# Patient Record
Sex: FEMALE | Race: WHITE | Employment: UNEMPLOYED | URBAN - METROPOLITAN AREA
[De-identification: names, ages, dates, MRNs, and addresses within clinical notes are randomized per-mention and may not be internally consistent; named-entity substitution may affect disease eponyms.]

---

## 2019-09-25 ENCOUNTER — OFFICE VISIT (OUTPATIENT)
Dept: ENDOCRINOLOGY | Age: 59
End: 2019-09-25
Payer: COMMERCIAL

## 2019-09-25 VITALS
WEIGHT: 121.2 LBS | BODY MASS INDEX: 23.8 KG/M2 | DIASTOLIC BLOOD PRESSURE: 74 MMHG | SYSTOLIC BLOOD PRESSURE: 116 MMHG | HEIGHT: 60 IN | HEART RATE: 76 BPM | RESPIRATION RATE: 16 BRPM

## 2019-09-25 DIAGNOSIS — Z86.39 H/O HYPERTHYROIDISM: ICD-10-CM

## 2019-09-25 DIAGNOSIS — E04.2 MULTINODULAR GOITER: Primary | ICD-10-CM

## 2019-09-25 DIAGNOSIS — E03.9 HYPOTHYROIDISM, UNSPECIFIED TYPE: ICD-10-CM

## 2019-09-25 PROCEDURE — 99214 OFFICE O/P EST MOD 30 MIN: CPT | Performed by: INTERNAL MEDICINE

## 2019-09-25 PROCEDURE — 1036F TOBACCO NON-USER: CPT | Performed by: INTERNAL MEDICINE

## 2019-09-25 PROCEDURE — 3017F COLORECTAL CA SCREEN DOC REV: CPT | Performed by: INTERNAL MEDICINE

## 2019-09-25 PROCEDURE — G8420 CALC BMI NORM PARAMETERS: HCPCS | Performed by: INTERNAL MEDICINE

## 2019-09-25 PROCEDURE — G8427 DOCREV CUR MEDS BY ELIG CLIN: HCPCS | Performed by: INTERNAL MEDICINE

## 2019-09-25 RX ORDER — DOXYCYCLINE 40 MG/1
40 CAPSULE ORAL DAILY
COMMUNITY

## 2019-09-25 RX ORDER — ELECTROLYTES/DEXTROSE
SOLUTION, ORAL ORAL DAILY
COMMUNITY

## 2019-09-25 RX ORDER — OSPEMIFENE 60 MG/1
TABLET, FILM COATED ORAL DAILY
Refills: 2 | COMMUNITY
Start: 2019-08-17

## 2019-09-25 SDOH — HEALTH STABILITY: MENTAL HEALTH: HOW OFTEN DO YOU HAVE A DRINK CONTAINING ALCOHOL?: 2-4 TIMES A MONTH

## 2019-09-25 NOTE — PROGRESS NOTES
[address] [place] [address] Department of Endocrinology Diabetes and Metabolism   [address] [facility] [address]   Phone: [phone]  Fax: [phone]    Date of Service: 9/25/2019    Primary Care Physician: Yessenia Askew MD  Provider: Duane Owen MD    Reason for the visit:  Hyperthyroidism, MNG, VitD deficiency     History of Present Illness: The history is provided by the patient. No  was used. Accuracy of the patient data is excellent. Yg Rivas is a very pleasant 66 y.o. female seen in the clinic today for follow up on MNG and hyperthyroidism     She had a routine lab work done by her PCP in mid 2017. Att that time a TSH and free T4 were obtained and revealed hyperthyroidism. Pt was sent for FNA of the nodule but was unable to get biopsy secondary to a small 8mm nodule, likely previous nodule was a pseudo nodule    Thyroid US 8/7/2017 0.7cm X 0.4cm X 0.5cm complex, solid nodule on right lobe and 0.8cm cyst on right lobe. 1.3cm X 0.7cm X 0.8cm poorly marginated nodule left lobe. Thyroid uptake and scan study 9/8/2017 6 and 24 hour uptake calculated at 4.82% and 6.18%. Impression: Question hypothyroidism given the low activity both at 6 and 24 hours. FNA unable to perform on 10/4/17 secondary to only a small 8mm less nodular areas in a diffusely inhomogeneous thyroid gland. Most recent thyroid US 9/12/2019  The right lobe measures 5.0 x 1.6 x 1.7 cm . The left lobe measures 4.6 x 1.7 x 1.6 cm. The isthmus measures 1 mm in thickness. There is a 10 x 9 x 9 mm nodule is isoechoic as well as heterogeneous to hypoechoic 10 x 7 x 8 and 7 x 4 x 6 mm nodule in the right lobe. There is Left 10 x 6 x 9 mm hypoechoic, 5 x 3 x 44 millimeter, as well as x 3 x 3 mm Hypoechoic nodules that are heterogeneous in echotexture in Lt lobe   IMPRESSION  Impression: Multinodular goiter. Mo numerous nodules are now seen in the left lobe compared to previous. from Last 4 Encounters:   19 116/74     Wt Readings from Last 6 Encounters:   19 121 lb 3.2 oz (55 kg)       Physical examination:  General: awake alert, oriented x3, no abnormal position or movements. HEENT: normocephalic non traumatic, no exophthalmos, no lid lag, no lid retraction   Neck: supple, no LN enlargement, no thyromegaly, no thyroid tenderness, no thyroid bruit, no JVD. Pulm: Clear equal air entry no added sounds, no wheezing or rhonchi    CVS: S1 + S2, no murmur, no heave. Dorsalis pedis pulse palpable   Abd: soft lax, no tenderness, no organomegaly, audible bowel sounds. Skin: warm, no lesions, no rash. No palmar erythema, no onycholysis, no pretibial Myxoedema, no acropachy   Musculoskeletal: No back tenderness, no kyphosis/scoliosis    Neuro: CN intact, sensation notmal , muscle power normal. No tremors   Psych: normal mood, and affect    Review of Laboratory Data:  I have reviewed the followin2019  Wbc 4.4, Hb 14, Plt 261, glucose 88, Cr 0.86, GFR >60, Na 142, K 4.6, Ca 8.8, AST 28, ALT 15, TG 81, , TSH 1.97, TPO-Ab negative, Tg-Ab negative   No results found for: WBC, RBC, HGB, HCT, MCV, MCH, MCHC, RDW, PLT, MPV, GRANULOCYTES, BANDS   No results found for: NA, K, CO2, BUN, CREATININE, CALCIUM, LABGLOM, GFRAA   No results found for: TSH, T4FREE, Q3QUCZF, FT3, A7QVZYQ, TSI, TPOABS, THGAB  No results found for: LABA1C, GLUCOSE, MALBCR, LABMICR, LABCREA  No results found for: CHOL, TRIG, HDL  No results found for: VITD25    Medical Records/Labs/Images Review:   I personally reviewed and summarized previous records   All labs and imaging were reviewed independently    [address], a 66 y.o.-old female seen in for management of following issues      Multinodular goiter   · Prevalence of thyroid nodule on thyroid ultrasound is 50% and 95 % of these nodules are benign.   · TFT before next visit   · Given nodule size and lack of ultrasound

## 2019-09-25 NOTE — LETTER
nodule left lobe. Thyroid uptake and scan study 9/8/2017 6 and 24 hour uptake calculated at 4.82% and 6.18%. Impression: Question hypothyroidism given the low activity both at 6 and 24 hours. FNA unable to perform on 10/4/17 secondary to only a small 8mm less nodular areas in a diffusely inhomogeneous thyroid gland. Most recent thyroid US 9/12/2019  The right lobe measures 5.0 x 1.6 x 1.7 cm . The left lobe measures 4.6 x 1.7 x 1.6 cm. The isthmus measures 1 mm in thickness. There is a 10 x 9 x 9 mm nodule is isoechoic as well as heterogeneous to hypoechoic 10 x 7 x 8 and 7 x 4 x 6 mm nodule in the right lobe. There is Left 10 x 6 x 9 mm hypoechoic, 5 x 3 x 44 millimeter, as well as x 3 x 3 mm Hypoechoic nodules that are heterogeneous in echotexture in Lt lobe   IMPRESSION  Impression: Multinodular goiter. Mo numerous nodules are now seen in the left lobe compared to previous. Overall, the nodules that were previously seen in the right lobe are slightly larger in volume    Sara Abreu denies any new lumps, bumps in her neck, change in her voice, or shortness of breath. No family history of thyroid cancer. No prior history of radiation to head or neck region. Patient denied any history of swelling in the area of the thyroid gland, weight loss, change in appetite, nervousness, anxiety, irritability, tremor, sweating, heat intolerance, changes in bowel habits, muscle weakness or difficulty sleeping. Patient also denied any h/o unexplained weight gain, new fatigue, increased sensitivity to cold, dry skin, brittle fingernails, brittle hair, facial swelling/puffiness, or depressed mood.      PAST MEDICAL HISTORY   Past Medical History:   Diagnosis Date    Multinodular goiter     Vitamin D deficiency      PAST SURGICAL HISTORY   Past Surgical History:   Procedure Laterality Date    BREAST BIOPSY      WISDOM TOOTH EXTRACTION       SOCIAL HISTORY   Social History     Socioeconomic History

## 2020-06-11 ENCOUNTER — VIRTUAL VISIT (OUTPATIENT)
Dept: ENDOCRINOLOGY | Age: 60
End: 2020-06-11
Payer: COMMERCIAL

## 2020-06-11 PROBLEM — E55.9 VITAMIN D DEFICIENCY: Status: ACTIVE | Noted: 2020-06-11

## 2020-06-11 PROBLEM — E04.2 MULTINODULAR GOITER: Status: ACTIVE | Noted: 2020-06-11

## 2020-06-11 PROBLEM — Z86.39 H/O HYPERTHYROIDISM: Status: ACTIVE | Noted: 2020-06-11

## 2020-06-11 PROCEDURE — 3017F COLORECTAL CA SCREEN DOC REV: CPT | Performed by: INTERNAL MEDICINE

## 2020-06-11 PROCEDURE — G8427 DOCREV CUR MEDS BY ELIG CLIN: HCPCS | Performed by: INTERNAL MEDICINE

## 2020-06-11 PROCEDURE — 99214 OFFICE O/P EST MOD 30 MIN: CPT | Performed by: INTERNAL MEDICINE

## 2020-06-11 PROCEDURE — G8420 CALC BMI NORM PARAMETERS: HCPCS | Performed by: INTERNAL MEDICINE

## 2020-06-11 PROCEDURE — 1036F TOBACCO NON-USER: CPT | Performed by: INTERNAL MEDICINE

## 2020-06-11 NOTE — LETTER
 Thyroid Disease Maternal Uncle     Thyroid Cancer Neg Hx      ALLERGIES AND DRUG REACTIONS   Allergies   Allergen Reactions    Diphenhydramine     Terfenadine        CURRENT MEDICATIONS   Current Outpatient Medications   Medication Sig Dispense Refill    Multiple Vitamins-Minerals (MULTIVITAMIN ADULT) TABS Take by mouth daily      doxycycline (ORACEA) 40 MG delayed release capsule Take 40 mg by mouth daily      OSPHENA 60 MG TABS daily  2     No current facility-administered medications for this visit. Review of Systems  Constitutional: No fever, no chills, no diaphoresis, no generalized weakness. HEENT: No blurred vision, No sore throat, no ear pain, no hair loss  Neck: denied any neck swelling, difficulty swallowing,   Cadrdiopulomary: No CP, SOB or palpitation, No orthopnea or PND. No cough or wheezing. GI: No N/V/D, no constipation, No abdominal pain, no melena or hematochezia   : Denied any dysuria, hematuria, flank pain, discharge, or incontinence. Skin: denied any rash, ulcer, Hirsute, or hyperpigmentation. MSK: denied any joint deformity, joint pain/swelling, muscle pain, or back pain. Neuro: no numbess, no tingling, no weakness,     OBJECTIVE    There were no vitals taken for this visit. BP Readings from Last 4 Encounters:   09/25/19 116/74     Wt Readings from Last 6 Encounters:   09/25/19 121 lb 3.2 oz (55 kg)     Physical examination:  Due to this being a TeleHealth encounter, evaluation of the following organ systems is limited: Vitals/Constitutional/EENT/Resp/CV/GI//MS/Neuro/Skin/Heme-Lymph-Imm. Modified physical exam through Telemedicine camera    General: Communicating well via camera   Neck: no obvious neck mass. No obvious neck deformity     CVS: no distress   Chest: no distress.  Chest is moving with respiration    Extremities:  no visible tremor  Skin: No visible rashes as seen from camera   Musculoskeletal: no visible deformity

## 2020-06-15 LAB
T4 FREE: 1.19
TSH SERPL DL<=0.05 MIU/L-ACNC: 1.23 UIU/ML
VITAMIN D 25-HYDROXY: 48.2
VITAMIN D2, 25 HYDROXY: NORMAL
VITAMIN D3,25 HYDROXY: NORMAL

## 2020-06-17 ENCOUNTER — TELEPHONE (OUTPATIENT)
Dept: ENDOCRINOLOGY | Age: 60
End: 2020-06-17

## 2020-07-06 ENCOUNTER — TELEPHONE (OUTPATIENT)
Dept: ENDOCRINOLOGY | Age: 60
End: 2020-07-06

## 2020-07-06 DIAGNOSIS — E03.9 HYPOTHYROIDISM, UNSPECIFIED TYPE: ICD-10-CM

## 2020-07-06 DIAGNOSIS — Z86.39 H/O HYPERTHYROIDISM: ICD-10-CM

## 2020-07-06 DIAGNOSIS — E04.2 MULTINODULAR GOITER: ICD-10-CM

## 2020-12-14 ENCOUNTER — TELEPHONE (OUTPATIENT)
Dept: ENDOCRINOLOGY | Age: 60
End: 2020-12-14

## 2020-12-15 NOTE — TELEPHONE ENCOUNTER
Notify pt,  I have reviewed your recent results    Thyroid nodules remained stable on this US.  Will discuss this result in details at your next OV

## 2021-06-14 ENCOUNTER — OFFICE VISIT (OUTPATIENT)
Dept: ENDOCRINOLOGY | Age: 61
End: 2021-06-14
Payer: COMMERCIAL

## 2021-06-14 VITALS
WEIGHT: 115 LBS | DIASTOLIC BLOOD PRESSURE: 72 MMHG | HEART RATE: 68 BPM | RESPIRATION RATE: 18 BRPM | BODY MASS INDEX: 22.58 KG/M2 | OXYGEN SATURATION: 99 % | SYSTOLIC BLOOD PRESSURE: 118 MMHG | HEIGHT: 60 IN

## 2021-06-14 DIAGNOSIS — E55.9 VITAMIN D DEFICIENCY: ICD-10-CM

## 2021-06-14 DIAGNOSIS — E04.2 MULTINODULAR GOITER: Primary | ICD-10-CM

## 2021-06-14 DIAGNOSIS — Z86.39 H/O HYPERTHYROIDISM: ICD-10-CM

## 2021-06-14 LAB
BUN BLDV-MCNC: NORMAL MG/DL
CALCIUM SERPL-MCNC: 9 MG/DL
CHLORIDE BLD-SCNC: NORMAL MMOL/L
CO2: NORMAL
CREAT SERPL-MCNC: 0.82 MG/DL
GFR CALCULATED: NORMAL
GLUCOSE BLD-MCNC: NORMAL MG/DL
POTASSIUM SERPL-SCNC: 4.4 MMOL/L
SODIUM BLD-SCNC: 141 MMOL/L
T4 FREE: 1.18
TSH SERPL DL<=0.05 MIU/L-ACNC: 1.13 UIU/ML
VITAMIN D 25-HYDROXY: 38.7
VITAMIN D2, 25 HYDROXY: NORMAL
VITAMIN D3,25 HYDROXY: NORMAL

## 2021-06-14 PROCEDURE — 1036F TOBACCO NON-USER: CPT | Performed by: INTERNAL MEDICINE

## 2021-06-14 PROCEDURE — G8427 DOCREV CUR MEDS BY ELIG CLIN: HCPCS | Performed by: INTERNAL MEDICINE

## 2021-06-14 PROCEDURE — 3017F COLORECTAL CA SCREEN DOC REV: CPT | Performed by: INTERNAL MEDICINE

## 2021-06-14 PROCEDURE — G8420 CALC BMI NORM PARAMETERS: HCPCS | Performed by: INTERNAL MEDICINE

## 2021-06-14 PROCEDURE — 99214 OFFICE O/P EST MOD 30 MIN: CPT | Performed by: INTERNAL MEDICINE

## 2021-06-14 NOTE — PROGRESS NOTES
[address] [place] [address] Department of Endocrinology Diabetes and Metabolism   [address] [facility] [address]   Phone: [phone]  Fax: [phone]    Date of Service: 6/14/2021  Primary Care Physician: Nneka Levy MD  Provider: Terra Dobbs MD    Reason for the visit:  Hyperthyroidism, MNG, VitD deficiency     History of Present Illness: The history is provided by the patient. No  was used. Accuracy of the patient data is excellent. Naveen Morales is a very pleasant 64 y.o. female seen today for follow up visit     She had a routine lab work done by her PCP in mid 2017. Att that time a TSH and free T4 were obtained and revealed hyperthyroidism. Pt was sent for FNA of the nodule but was unable to get biopsy secondary to a small 8mm nodule, likely previous nodule was a pseudo nodule    Thyroid US 8/7/2017 0.7cm X 0.4cm X 0.5cm complex, solid nodule on right lobe and 0.8cm cyst on right lobe. 1.3cm X 0.7cm X 0.8cm poorly marginated nodule left lobe. Thyroid uptake and scan study 9/8/2017 6 and 24 hour uptake calculated at 4.82% and 6.18%. Impression: Question hypothyroidism given the low activity both at 6 and 24 hours. FNA unable to perform on 10/4/17 secondary to only a small 8mm less nodular areas in a diffusely inhomogeneous thyroid gland. Thyroid US 9/12/2019  The right lobe measures 5.0 x 1.6 x 1.7 cm --> multiple nodules largest measuring 10, 10 and 7 mm   The left lobe measures 4.6 x 1.7 x 1.6 cm --> multiple nodules largest measuring 10, 5 and 3 mm   The isthmus measures 1 mm --> no nodules     Thyroid US 5/29/2020  Stable Rt and Lt lobe thyroid nodules   New 1.2 cm mixed thyroid nodule in the Rt thyroid isthmus     US 12/2020 --> stable MNG, isthmus nodule measuring 1.1 cm     Naveen Morales denies any new lumps, bumps in her neck, change in her voice, or shortness of breath. No family history of thyroid cancer.  No prior history of radiation to head or neck region. Patient denied any history of swelling in the area of the thyroid gland, weight loss, change in appetite, nervousness, anxiety, irritability, tremor, sweating, heat intolerance, changes in bowel habits, muscle weakness or difficulty sleeping    Patient also denied any h/o unexplained weight gain, new fatigue, increased sensitivity to cold, dry skin, brittle fingernails, brittle hair, facial swelling/puffiness, or depressed mood. PAST MEDICAL HISTORY   Past Medical History:   Diagnosis Date    Multinodular goiter     Vitamin D deficiency      PAST SURGICAL HISTORY   Past Surgical History:   Procedure Laterality Date    BREAST BIOPSY      WISDOM TOOTH EXTRACTION       SOCIAL HISTORY   Tobacco:   reports that she has never smoked. She has never used smokeless tobacco.  Alcol:   reports current alcohol use. Illicit Drugs:   has no history on file for drug use. FAMILY HISTORY   Family History   Problem Relation Age of Onset    Thyroid Disease Mother     Thyroid Disease Maternal Aunt         s/p thyroid surgery (benign)     Thyroid Disease Maternal Uncle     Thyroid Cancer Neg Hx      ALLERGIES AND DRUG REACTIONS   Allergies   Allergen Reactions    Diphenhydramine     Terfenadine        CURRENT MEDICATIONS   Current Outpatient Medications   Medication Sig Dispense Refill    Multiple Vitamins-Minerals (MULTIVITAMIN ADULT) TABS Take by mouth daily      doxycycline (ORACEA) 40 MG delayed release capsule Take 40 mg by mouth daily      OSPHENA 60 MG TABS daily  2     No current facility-administered medications for this visit. Review of Systems  Constitutional: No fever, no chills, no diaphoresis, no generalized weakness. HEENT: No blurred vision, No sore throat, no ear pain, no hair loss  Neck: denied any neck swelling, difficulty swallowing,   Cadrdiopulomary: No CP, SOB or palpitation, No orthopnea or PND. No cough or wheezing.   GI: No N/V/D, no constipation, No abdominal pain, no melena or hematochezia   : Denied any dysuria, hematuria, flank pain, discharge, or incontinence. Skin: denied any rash, ulcer, Hirsute, or hyperpigmentation. MSK: denied any joint deformity, joint pain/swelling, muscle pain, or back pain. Neuro: no numbess, no tingling, no weakness,     OBJECTIVE    /72   Pulse 68   Resp 18   Ht 5' (1.524 m)   Wt 115 lb (52.2 kg)   SpO2 99%   BMI 22.46 kg/m²   BP Readings from Last 4 Encounters:   06/14/21 118/72   09/25/19 116/74     Wt Readings from Last 6 Encounters:   06/14/21 115 lb (52.2 kg)   09/25/19 121 lb 3.2 oz (55 kg)     Physical examination:  General: awake alert, oriented x3  HEENT: normocephalic non traumatic, no exophthalmos   Neck: supple, thyroid tenderness, I couldn't palpate her thyroid nodules today   Pulm: Clear equal air entry no added sounds  CVS: S1 + S2  Abd: soft lax, no tenderness  Skin: warm, no lesions, no rash. No open wounds, no ulcers   Neuro: CN intact,  muscle power normal  Psych: normal mood, and affect    Review of Laboratory Data:  I have reviewed the following  No results found for: WBC, RBC, HGB, HCT, MCV, MCH, MCHC, RDW, PLT, MPV, GRANULOCYTES, BANDS   No results found for: NA, K, CO2, BUN, CREATININE, CALCIUM, LABGLOM, GFRAA   Lab Results   Component Value Date/Time    TSH 1.230 06/15/2020 12:00 AM    T4FREE 1.19 06/15/2020 12:00 AM     No results found for: LABA1C, GLUCOSE, MALBCR, LABMICR, LABCREA  No results found for: CHOL, TRIG, HDL  Lab Results   Component Value Date    VITD25 48.2 06/15/2020     ASSESSMENT & RECOMMENDATIONS   Denece Lefort, a 64 y.o.-old female seen in for management of following issues      Multinodular goiter   · Prevalence of thyroid nodule on thyroid ultrasound is 50% and 95 % of these nodules are benign. · US 5/29/2020 --> new 1.2 cm nodule in Rt isthmus. Last US 12/2020 --> stable MNG   · We sent her for FNA to isthmus nodule in 6/2020.  [facility] looked over the pt's ultrasound, and he is not comfortable doing a biopsy d/t the nodule being so close to her trachea. · No suspicious feature in this nodule   · Will repeat US   · Ordered TFT     H/o hyperthyroidism   · TFT were normal on last labs in 9/2019  · Repeat labs now     vitD deficiency   · continue vitD supplement   · Check vitD level before next visit     I personally reviewed external notes from PCP and other patient's care team providers, and personally interpreted labs associated with the above diagnosis. I also ordered labs to further assess and manage the above addressed medical conditions    Return in about 1 year (around 6/14/2022) for Multinodular goiter, VitD deficiency. The above issues were reviewed with the patient who understood and agreed with the plan  Thank you for allowing us to participate in the care of this patient. Please do not hesitate to contact us with any additional questions. Diagnosis Orders   1. Multinodular goiter  TSH without Reflex    T4, Free    US THYROID   2. Vitamin D deficiency  Vitamin D 25 Hydroxy    Basic Metabolic Panel   3. H/O hyperthyroidism  TSH without Reflex    T4, Free       Tia Florez MD  Endocrinologist, [place] [address] Diabetes Care and Endocrinology   [address] [place] [address],[address] [address]   Phone: [phone]  Fax: [phone]  ---------------------------------  An electronic signature was used to authenticate this note.  Juliana Fitzpatrick MD on 6/14/2021 at 8:49 AM

## 2021-07-06 ENCOUNTER — TELEPHONE (OUTPATIENT)
Dept: ENDOCRINOLOGY | Age: 61
End: 2021-07-06

## 2021-07-06 DIAGNOSIS — E04.2 MULTINODULAR GOITER: ICD-10-CM

## 2021-07-06 DIAGNOSIS — Z86.39 H/O HYPERTHYROIDISM: ICD-10-CM

## 2021-07-06 DIAGNOSIS — E55.9 VITAMIN D DEFICIENCY: ICD-10-CM

## 2021-07-07 NOTE — TELEPHONE ENCOUNTER
Notify pt,  I have reviewed your recent results    Great!, thyroid hormones results are within an acceptable range of normal

## 2022-05-13 DIAGNOSIS — E04.2 MULTINODULAR GOITER: ICD-10-CM

## 2022-05-13 DIAGNOSIS — E55.9 VITAMIN D DEFICIENCY: Primary | ICD-10-CM

## 2022-06-06 ENCOUNTER — HOSPITAL ENCOUNTER (OUTPATIENT)
Dept: ULTRASOUND IMAGING | Age: 62
Discharge: HOME | End: 2022-06-08
Payer: COMMERCIAL

## 2022-06-06 DIAGNOSIS — E04.2 MULTINODULAR GOITER: ICD-10-CM

## 2022-06-06 PROCEDURE — 76536 US EXAM OF HEAD AND NECK: CPT

## 2022-09-15 ENCOUNTER — HOSPITAL ENCOUNTER (OUTPATIENT)
Age: 62
Discharge: HOME | End: 2022-09-15
Payer: COMMERCIAL

## 2022-09-15 DIAGNOSIS — E55.9 VITAMIN D DEFICIENCY: ICD-10-CM

## 2022-09-15 DIAGNOSIS — E04.2 MULTINODULAR GOITER: ICD-10-CM

## 2022-09-15 LAB
ANION GAP SERPL CALCULATED.3IONS-SCNC: 10 MMOL/L (ref 7–16)
BUN BLDV-MCNC: 11 MG/DL (ref 6–20)
CALCIUM SERPL-MCNC: 9.6 MG/DL (ref 8.6–10.2)
CHLORIDE BLD-SCNC: 102 MMOL/L (ref 98–107)
CO2: 27 MMOL/L (ref 22–29)
CREAT SERPL-MCNC: 0.8 MG/DL (ref 0.5–1)
GFR AFRICAN AMERICAN: >60
GFR NON-AFRICAN AMERICAN: >60 ML/MIN/1.73
GLUCOSE BLD-MCNC: 71 MG/DL (ref 74–99)
POTASSIUM SERPL-SCNC: 4.6 MMOL/L (ref 3.5–5)
SODIUM BLD-SCNC: 139 MMOL/L (ref 132–146)
T4 FREE: 1.11 NG/DL (ref 0.93–1.7)
TSH SERPL DL<=0.05 MIU/L-ACNC: 1.6 UIU/ML (ref 0.27–4.2)
VITAMIN D 25-HYDROXY: 59 NG/ML (ref 30–100)

## 2022-09-15 PROCEDURE — 80048 BASIC METABOLIC PNL TOTAL CA: CPT

## 2022-09-15 PROCEDURE — 82306 VITAMIN D 25 HYDROXY: CPT

## 2022-09-15 PROCEDURE — 84439 ASSAY OF FREE THYROXINE: CPT

## 2022-09-15 PROCEDURE — 84443 ASSAY THYROID STIM HORMONE: CPT

## 2022-09-15 PROCEDURE — 36415 COLL VENOUS BLD VENIPUNCTURE: CPT

## 2022-09-20 ENCOUNTER — OFFICE VISIT (OUTPATIENT)
Dept: ENDOCRINOLOGY | Age: 62
End: 2022-09-20
Payer: COMMERCIAL

## 2022-09-20 VITALS
HEIGHT: 60 IN | HEART RATE: 82 BPM | RESPIRATION RATE: 18 BRPM | BODY MASS INDEX: 22.97 KG/M2 | SYSTOLIC BLOOD PRESSURE: 127 MMHG | DIASTOLIC BLOOD PRESSURE: 88 MMHG | WEIGHT: 117 LBS | OXYGEN SATURATION: 99 %

## 2022-09-20 DIAGNOSIS — E55.9 VITAMIN D DEFICIENCY: ICD-10-CM

## 2022-09-20 DIAGNOSIS — E04.2 MULTINODULAR GOITER: Primary | ICD-10-CM

## 2022-09-20 PROCEDURE — G8420 CALC BMI NORM PARAMETERS: HCPCS | Performed by: CLINICAL NURSE SPECIALIST

## 2022-09-20 PROCEDURE — 3017F COLORECTAL CA SCREEN DOC REV: CPT | Performed by: CLINICAL NURSE SPECIALIST

## 2022-09-20 PROCEDURE — 1036F TOBACCO NON-USER: CPT | Performed by: CLINICAL NURSE SPECIALIST

## 2022-09-20 PROCEDURE — 99214 OFFICE O/P EST MOD 30 MIN: CPT | Performed by: CLINICAL NURSE SPECIALIST

## 2022-09-20 PROCEDURE — G8427 DOCREV CUR MEDS BY ELIG CLIN: HCPCS | Performed by: CLINICAL NURSE SPECIALIST

## 2022-09-20 NOTE — PROGRESS NOTES
[address] [place] [address] Department of Endocrinology Diabetes and Metabolism   [address] [facility] [address]   Phone: [phone]  Fax: [phone]    Date of Service: 9/20/2022  Primary Care Physician: Katya Boo MD  Provider: PARESH Serra      Reason for the visit:  Hyperthyroidism, MNG, VitD deficiency     History of Present Illness: The history is provided by the patient. No  was used. Accuracy of the patient data is excellent. Sulaiman Amezquita is a very pleasant 63 y.o. female seen today for follow up visit     She had a routine lab work done by her PCP in mid 2017. Att that time a TSH and free T4 were obtained and revealed hyperthyroidism. Pt was sent for FNA of the nodule but was unable to get biopsy secondary to a small 8mm nodule, likely previous nodule was a pseudo nodule    Thyroid US 8/7/2017 0.7cm X 0.4cm X 0.5cm complex, solid nodule on right lobe and 0.8cm cyst on right lobe. 1.3cm X 0.7cm X 0.8cm poorly marginated nodule left lobe. Thyroid uptake and scan study 9/8/2017 6 and 24 hour uptake calculated at 4.82% and 6.18%. Impression: Question hypothyroidism given the low activity both at 6 and 24 hours. FNA unable to perform on 10/4/17 secondary to only a small 8mm less nodular areas in a diffusely inhomogeneous thyroid gland.     Thyroid US 9/12/2019  The right lobe measures 5.0 x 1.6 x 1.7 cm --> multiple nodules largest measuring 10, 10 and 7 mm   The left lobe measures 4.6 x 1.7 x 1.6 cm --> multiple nodules largest measuring 10, 5 and 3 mm   The isthmus measures 1 mm --> no nodules     Thyroid US 5/29/2020  Stable Rt and Lt lobe thyroid nodules   New 1.2 cm mixed thyroid nodule in the Rt thyroid isthmus     US 12/2020 --> stable MNG, isthmus nodule measuring 1.1 cm     US 6/2022 --> right midpole nodule measuring 1.3 x 0.7 x 1.1 cm solid, hypoechoic, left 1.2 x 0.8 x 1.2 cm spongiform nodule      Sulaiman Amezquita denies any new lumps, bumps in her neck, change in her voice, or shortness of breath. No family history of thyroid cancer. No prior history of radiation to head or neck region. Patient denied any history of swelling in the area of the thyroid gland, weight loss, change in appetite, nervousness, anxiety, irritability, tremor, sweating, heat intolerance, changes in bowel habits, muscle weakness or difficulty sleeping    Patient also denied any h/o unexplained weight gain, new fatigue, increased sensitivity to cold, dry skin, brittle fingernails, brittle hair, facial swelling/puffiness, or depressed mood. Lab Results   Component Value Date    TSH 1.600 09/15/2022    T4FREE 1.11 09/15/2022         PAST MEDICAL HISTORY   Past Medical History:   Diagnosis Date    Multinodular goiter     Vitamin D deficiency      PAST SURGICAL HISTORY   Past Surgical History:   Procedure Laterality Date    BREAST BIOPSY      WISDOM TOOTH EXTRACTION       SOCIAL HISTORY   Tobacco:   reports that she has never smoked. She has never used smokeless tobacco.  Alcol:   reports current alcohol use. Illicit Drugs:   has no history on file for drug use. FAMILY HISTORY   Family History   Problem Relation Age of Onset    Thyroid Disease Mother     Thyroid Disease Maternal Aunt         s/p thyroid surgery (benign)     Thyroid Disease Maternal Uncle     Thyroid Cancer Neg Hx      ALLERGIES AND DRUG REACTIONS   Allergies   Allergen Reactions    Diphenhydramine     Terfenadine        CURRENT MEDICATIONS   Current Outpatient Medications   Medication Sig Dispense Refill    Multiple Vitamins-Minerals (MULTIVITAMIN ADULT) TABS Take by mouth daily      doxycycline (ORACEA) 40 MG delayed release capsule Take 40 mg by mouth daily      OSPHENA 60 MG TABS daily  2     No current facility-administered medications for this visit. Review of Systems  Constitutional: No fever, no chills, no diaphoresis, no generalized weakness.   HEENT: No blurred vision, No No results found for: CHOL, TRIG, HDL, LDL  Lab Results   Component Value Date/Time    VITD25 59 09/15/2022 09:03 AM    VITD25 38.7 06/14/2021 12:00 AM     ASSESSMENT & RECOMMENDATIONS   Angie Wilkes, a 63 y.o.-old female seen in for management of following issues      Multinodular goiter   Prevalence of thyroid nodule on thyroid ultrasound is 50% and 95 % of these nodules are benign. US 5/29/2020 --> new 1.2 cm nodule in Rt isthmus. Last US 12/2020 --> stable MNG   We sent her for FNA to isthmus nodule in 6/2020. The radiologist at [facility] looked over the pt's ultrasound, and he is not comfortable doing a biopsy d/t the nodule being so close to her trachea. No suspicious feature in this nodule   US 6/2022--> Right 1.3 cm hypoechoic nodule and left 1.2 cm spongiform nodule   Repeat thyroid US 6/2023   TFT's WNL   Repeat TFT's 6/2023     H/o hyperthyroidism   TFT were normal on last labs in 9/2019  Last labs WNL     vitD deficiency   continue vitD supplement   Last vitamin D WNL   Check Vit D 6/2023    I personally spent > 30  minutes reviewing  external notes from PCP and other patient's care team providers, and personally interpreted labs associated with the above diagnosis. I also ordered labs to further assess and manage the above addressed medical conditions    Return in about 10 months (around 7/20/2023). The above issues were reviewed with the patient who understood and agreed with the plan  Thank you for allowing us to participate in the care of this patient. Please do not hesitate to contact us with any additional questions. Diagnosis Orders   1. Multinodular goiter  T4, Free    TSH    US THYROID      2. Vitamin D deficiency  Vitamin D 25 Hydroxy          PARESH Rubio - CNS    [facility] Diabetes Care and Endocrinology   [address] [facility] [address]   Phone: [phone]  Fax: [phone]  ---------------------------------  An electronic signature was used to authenticate this note. Amandeep Strauss, APRN - CNS  on 9/20/2022 at 7:56 AM

## 2023-05-17 LAB
T4 FREE: 1.38
TSH SERPL DL<=0.05 MIU/L-ACNC: 1.02 UIU/ML

## 2023-05-24 ENCOUNTER — OFFICE VISIT (OUTPATIENT)
Dept: ENDOCRINOLOGY | Age: 63
End: 2023-05-24
Payer: COMMERCIAL

## 2023-05-24 VITALS
OXYGEN SATURATION: 99 % | DIASTOLIC BLOOD PRESSURE: 78 MMHG | RESPIRATION RATE: 18 BRPM | SYSTOLIC BLOOD PRESSURE: 134 MMHG | BODY MASS INDEX: 19.24 KG/M2 | HEIGHT: 60 IN | HEART RATE: 71 BPM | WEIGHT: 98 LBS

## 2023-05-24 DIAGNOSIS — Z86.39 H/O HYPERTHYROIDISM: ICD-10-CM

## 2023-05-24 DIAGNOSIS — E04.2 MULTINODULAR GOITER: Primary | ICD-10-CM

## 2023-05-24 PROCEDURE — G8420 CALC BMI NORM PARAMETERS: HCPCS | Performed by: INTERNAL MEDICINE

## 2023-05-24 PROCEDURE — 3017F COLORECTAL CA SCREEN DOC REV: CPT | Performed by: INTERNAL MEDICINE

## 2023-05-24 PROCEDURE — G8427 DOCREV CUR MEDS BY ELIG CLIN: HCPCS | Performed by: INTERNAL MEDICINE

## 2023-05-24 PROCEDURE — 1036F TOBACCO NON-USER: CPT | Performed by: INTERNAL MEDICINE

## 2023-05-24 PROCEDURE — 99214 OFFICE O/P EST MOD 30 MIN: CPT | Performed by: INTERNAL MEDICINE

## 2023-05-24 NOTE — PROGRESS NOTES
6/2020. The radiologist at [facility] looked over the pt's ultrasound, and he is not comfortable doing a biopsy d/t the nodule being so close to her trachea. US 6/2022 --> stable Tr4 thyroid nodule on the Rt side   Will continue following with US     H/o hyperthyroidism   TFT were normal on last labs in 9/2019  TFT 9/2022 - WNL   Labs in 6 months and before next visit     vitD deficiency   continue vitD supplement      I personally reviewed external notes from PCP and other patient's care team providers, and personally interpreted labs associated with the above diagnosis. I also ordered labs to further assess and manage the above addressed medical conditions    Return in 1 year (on 5/24/2024) for Multinodular goiter. The above issues were reviewed with the patient who understood and agreed with the plan  Thank you for allowing us to participate in the care of this patient. Please do not hesitate to contact us with any additional questions. Diagnosis Orders   1. Multinodular goiter  US THYROID    TSH    T4, Free    TSH    T4, Free      2. H/O hyperthyroidism            Boris Velasquez MD  Endocrinologist, [facility] Diabetes Care and Endocrinology   [address] [facility] [address]   Phone: [phone]  Fax: [phone]  ---------------------------------  An electronic signature was used to authenticate this note.  Celina Man MD on 5/24/2023 at 8:39 AM

## 2023-06-02 DIAGNOSIS — E04.2 MULTINODULAR GOITER: ICD-10-CM

## 2023-06-19 ENCOUNTER — HOSPITAL ENCOUNTER (OUTPATIENT)
Dept: ULTRASOUND IMAGING | Age: 63
Discharge: HOME | End: 2023-06-21
Attending: INTERNAL MEDICINE
Payer: COMMERCIAL

## 2023-06-19 DIAGNOSIS — E04.2 MULTINODULAR GOITER: ICD-10-CM

## 2023-06-19 PROCEDURE — 76536 US EXAM OF HEAD AND NECK: CPT

## 2023-06-25 ENCOUNTER — TELEPHONE (OUTPATIENT)
Dept: ENDOCRINOLOGY | Age: 63
End: 2023-06-25

## 2024-05-14 ENCOUNTER — HOSPITAL ENCOUNTER (OUTPATIENT)
Dept: ULTRASOUND IMAGING | Age: 64
Discharge: HOME | End: 2024-05-16
Attending: INTERNAL MEDICINE
Payer: COMMERCIAL

## 2024-05-14 DIAGNOSIS — E04.2 NONTOXIC MULTINODULAR GOITER: ICD-10-CM

## 2024-05-14 PROCEDURE — 76536 US EXAM OF HEAD AND NECK: CPT

## 2024-05-20 ENCOUNTER — OFFICE VISIT (OUTPATIENT)
Dept: ENDOCRINOLOGY | Age: 64
End: 2024-05-20
Payer: COMMERCIAL

## 2024-05-20 VITALS
SYSTOLIC BLOOD PRESSURE: 144 MMHG | RESPIRATION RATE: 18 BRPM | WEIGHT: 107 LBS | HEART RATE: 75 BPM | HEIGHT: 60 IN | DIASTOLIC BLOOD PRESSURE: 87 MMHG | OXYGEN SATURATION: 99 % | BODY MASS INDEX: 21.01 KG/M2

## 2024-05-20 DIAGNOSIS — Z86.39 H/O HYPERTHYROIDISM: ICD-10-CM

## 2024-05-20 DIAGNOSIS — E55.9 VITAMIN D DEFICIENCY: ICD-10-CM

## 2024-05-20 DIAGNOSIS — E04.2 MULTINODULAR GOITER: Primary | ICD-10-CM

## 2024-05-20 PROCEDURE — G8427 DOCREV CUR MEDS BY ELIG CLIN: HCPCS | Performed by: INTERNAL MEDICINE

## 2024-05-20 PROCEDURE — 99214 OFFICE O/P EST MOD 30 MIN: CPT | Performed by: INTERNAL MEDICINE

## 2024-05-20 PROCEDURE — 3017F COLORECTAL CA SCREEN DOC REV: CPT | Performed by: INTERNAL MEDICINE

## 2024-05-20 PROCEDURE — G8420 CALC BMI NORM PARAMETERS: HCPCS | Performed by: INTERNAL MEDICINE

## 2024-05-20 PROCEDURE — 1036F TOBACCO NON-USER: CPT | Performed by: INTERNAL MEDICINE

## 2024-05-20 NOTE — PROGRESS NOTES
nodule in Rt isthmus. Last US 12/2020 --> stable MNG   We sent her for FNA to isthmus nodule in 6/2020. The radiologist at [facility] looked over the pt's ultrasound, and he is not comfortable doing a biopsy d/t the nodule being so close to her trachea.  Recent thyroid ultrasound in May 16, 2024 showed a stable multinodular goiter    H/o hyperthyroidism   TFT were normal on last labs in 9/2019  Recent lab in May 2024 showed a normal TFTs  Will continue monitoring    vitD deficiency   continue vitD supplement      I personally reviewed external notes from PCP and other patient's care team providers, and personally interpreted labs associated with the above diagnosis. I also ordered labs to further assess and manage the above addressed medical conditions    No follow-ups on file.    The above issues were reviewed with the patient who understood and agreed with the plan  Thank you for allowing us to participate in the care of this patient. Please do not hesitate to contact us with any additional questions.    Diagnosis Orders   1. Multinodular goiter  TSH    T4, Free    US THYROID      2. H/O hyperthyroidism        3. Vitamin D deficiency              Ubaldo Lucas MD  Endocrinologist, [facility]   [address] [place] [id number]   Phone: [phone]  Fax: [phone]  ---------------------------------  An electronic signature was used to authenticate this note. Ubaldo Lucas MD on 5/20/2024 at 8:59 AM

## 2025-05-20 ENCOUNTER — OFFICE VISIT (OUTPATIENT)
Dept: ENDOCRINOLOGY | Age: 65
End: 2025-05-20
Payer: COMMERCIAL

## 2025-05-20 VITALS
BODY MASS INDEX: 21.6 KG/M2 | HEART RATE: 67 BPM | DIASTOLIC BLOOD PRESSURE: 85 MMHG | SYSTOLIC BLOOD PRESSURE: 136 MMHG | OXYGEN SATURATION: 98 % | HEIGHT: 60 IN | TEMPERATURE: 98.2 F | WEIGHT: 110 LBS

## 2025-05-20 DIAGNOSIS — E04.2 MULTINODULAR GOITER: Primary | ICD-10-CM

## 2025-05-20 DIAGNOSIS — Z86.39 H/O HYPERTHYROIDISM: ICD-10-CM

## 2025-05-20 DIAGNOSIS — E55.9 VITAMIN D DEFICIENCY: ICD-10-CM

## 2025-05-20 PROCEDURE — G8427 DOCREV CUR MEDS BY ELIG CLIN: HCPCS | Performed by: INTERNAL MEDICINE

## 2025-05-20 PROCEDURE — G2211 COMPLEX E/M VISIT ADD ON: HCPCS | Performed by: INTERNAL MEDICINE

## 2025-05-20 PROCEDURE — 3017F COLORECTAL CA SCREEN DOC REV: CPT | Performed by: INTERNAL MEDICINE

## 2025-05-20 PROCEDURE — 1036F TOBACCO NON-USER: CPT | Performed by: INTERNAL MEDICINE

## 2025-05-20 PROCEDURE — 99214 OFFICE O/P EST MOD 30 MIN: CPT | Performed by: INTERNAL MEDICINE

## 2025-05-20 PROCEDURE — G8420 CALC BMI NORM PARAMETERS: HCPCS | Performed by: INTERNAL MEDICINE

## 2025-05-20 RX ORDER — MULTIVIT-MIN/IRON/FOLIC ACID/K 18-600-40
2000 CAPSULE ORAL DAILY
COMMUNITY

## 2025-05-20 NOTE — PROGRESS NOTES
[facility] Department of Endocrinology Diabetes and Metabolism   [address] [place] [id number]   Phone: [phone]  Fax: [phone]    Date of Service: 5/20/2025  Primary Care Physician: Francisco Bruno MD  Provider: Ubaldo Lucas MD    Reason for the visit:  Hyperthyroidism, MNG, VitD deficiency     History of Present Illness:  The history is provided by the patient. No  was used. Accuracy of the patient data is excellent.    Edel Lopez is a very pleasant 64 y.o. female seen today for follow up visit     She had a routine lab work done by her PCP in mid 2017. Att that time a TSH and free T4 were obtained and revealed hyperthyroidism.   Pt was sent for FNA of the nodule but was unable to get biopsy secondary to a small 8mm nodule, likely previous nodule was a pseudo nodule    Thyroid US 8/7/2017 0.7cm X 0.4cm X 0.5cm complex, solid nodule on right lobe and 0.8cm cyst on right lobe. 1.3cm X 0.7cm X 0.8cm poorly marginated nodule left lobe.    Thyroid uptake and scan study 9/8/2017 6 and 24 hour uptake calculated at 4.82% and 6.18%. Impression: Question hypothyroidism given the low activity both at 6 and 24 hours.    FNA unable to perform on 10/4/17 secondary to only a small 8mm less nodular areas in a diffusely inhomogeneous thyroid gland.    Thyroid US 9/12/2019  The right lobe measures 5.0 x 1.6 x 1.7 cm --> multiple nodules largest measuring 10, 10 and 7 mm   The left lobe measures 4.6 x 1.7 x 1.6 cm --> multiple nodules largest measuring 10, 5 and 3 mm   The isthmus measures 1 mm --> no nodules     Thyroid US 5/29/2020  Stable Rt and Lt lobe thyroid nodules   New 1.2 cm mixed thyroid nodule in the Rt thyroid isthmus     US 12/2020 , 6/2022 and in May 2024 in May 2025 at [facility]--> stable MNG    Recent blood work in May 2025 showed normal TFTs    Edel Lopez denies any new lumps, bumps in her neck, change in her voice, or shortness of